# Patient Record
Sex: FEMALE | Race: OTHER | Employment: OTHER | URBAN - METROPOLITAN AREA
[De-identification: names, ages, dates, MRNs, and addresses within clinical notes are randomized per-mention and may not be internally consistent; named-entity substitution may affect disease eponyms.]

---

## 2017-07-14 ENCOUNTER — HOSPITAL ENCOUNTER (EMERGENCY)
Age: 22
Discharge: HOME | End: 2017-07-14
Attending: FAMILY MEDICINE
Payer: OTHER GOVERNMENT

## 2017-07-14 VITALS
BODY MASS INDEX: 26.58 KG/M2 | WEIGHT: 150 LBS | HEART RATE: 63 BPM | HEIGHT: 63 IN | TEMPERATURE: 98.1 F | SYSTOLIC BLOOD PRESSURE: 107 MMHG | RESPIRATION RATE: 18 BRPM | DIASTOLIC BLOOD PRESSURE: 64 MMHG | OXYGEN SATURATION: 99 %

## 2017-07-14 DIAGNOSIS — L73.2 HYDRADENITIS: Primary | ICD-10-CM

## 2017-07-14 PROCEDURE — 99283 EMERGENCY DEPT VISIT LOW MDM: CPT

## 2017-07-14 RX ORDER — TRAMADOL HYDROCHLORIDE 50 MG/1
50 TABLET ORAL
Qty: 10 TAB | Refills: 0 | Status: SHIPPED | OUTPATIENT
Start: 2017-07-14

## 2017-07-14 RX ORDER — CEPHALEXIN 500 MG/1
500 CAPSULE ORAL 4 TIMES DAILY
Qty: 28 CAP | Refills: 0 | Status: SHIPPED | OUTPATIENT
Start: 2017-07-14 | End: 2017-07-21

## 2017-07-14 RX ORDER — IBUPROFEN 800 MG/1
800 TABLET ORAL 3 TIMES DAILY
Qty: 20 TAB | Refills: 0 | Status: SHIPPED | OUTPATIENT
Start: 2017-07-14 | End: 2017-07-21

## 2017-07-15 NOTE — ED TRIAGE NOTES
Cyst under both arm pits, has had them in the past, took antibiotics and they return, started about `1.5 weeks ago

## 2017-07-15 NOTE — ED PROVIDER NOTES
[address]  EMERGENCY DEPARTMENT HISTORY AND PHYSICAL EXAM       Date: 7/14/2017   Patient Name: Kavon Jaimes   YOB: 1995  Medical Record Number: [id number]      HISTORY OF PRESENTING ILLNESS     Chief Complaint   Patient presents with    Cyst        History Provided By:  patient    Additional History:   9:18 PM    Devora Jaimes is a 23 y.o. female presenting ambulatory to the ED c/o 6/10 bilateral axilla pain from \"cysts\" x over 1 week. Pt states that a few of the cysts have been draining. Pt notes increase of her pain with arm movement. Pt states that she has had a hx of similar sxs with previous abscesses. Pt denies trying any medications for her sxs. Pt denies any history of chronic medical issues or surgeries. Pt specifically denies any fever/chills. PCP: None  Social Hx: - tobacco use, - alcohol use, - illicit drug use    There are no other complaints, changes, or physical findings at this time. PAST HISTORY    Past Medical History:   History reviewed. No pertinent past medical history. Past Surgical History:   History reviewed. No pertinent surgical history. Social History:   Social History   Substance Use Topics    Smoking status: Never Smoker    Smokeless tobacco: Never Used    Alcohol use No        Allergies:   No Known Allergies     Review of Systems   Review of Systems   Constitutional: Negative for chills and fever. Musculoskeletal: Positive for myalgias (bilateral axilla). Skin:        + \"cysts\" to bilateral axilla   All other systems reviewed and are negative. PHYSICAL EXAM    Vitals:    07/14/17 2059   BP: 107/64   Pulse: 63   Resp: 18   Temp: 98.1 °F (36.7 °C)   SpO2: 99%   Weight: 68 kg (150 lb)   Height: 5' 3\" (1.6 m)       Physical Exam   Nursing note and vitals reviewed. Vital signs and nursing notes reviewed    CONSTITUTIONAL: Alert, in no apparent distress; well-developed; well-nourished.   HEAD:  Normocephalic, atraumatic  EYES: PERRL; EOM's intact. ENTM: Nose: no rhinorrhea; Throat: no erythema or exudate, mucous membranes moist  Neck:  No JVD, supple without lymphadenopathy  RESP: Chest clear, equal breath sounds. CV: S1 and S2 WNL; No murmurs, gallops or rubs. GI: Normal bowel sounds, abdomen soft and non-tender. No masses or organomegaly. : No costo-vertebral angle tenderness. BACK:  Non-tender  UPPER EXT:  Normal inspection  LOWER EXT: No edema, no calf tenderness. Distal pulses intact. NEURO: CN intact, reflexes 2/4 and sym, strength 5/5 and sym, sensation intact. SKIN: No rashes; 3 small tender, non fluctuant cystic lesions to the right axilla. 2 small tender, non fluctuant cystic lesions to the right axilla. There is no warmth or erythema  PSYCH:  Alert and oriented, normal affect. DIAGNOSTIC RESULTS    Labs -    No results found for this or any previous visit (from the past 12 hour(s)). MEDICAL DECISION MAKING  I am the first provider for this patient. I reviewed the vital signs, available nursing notes, past medical history, past surgical history, family history and social history. Vital Signs-Reviewed the patient's vital signs. Patient Vitals for the past 12 hrs:   Temp Pulse Resp BP SpO2   07/14/17 2059 98.1 °F (36.7 °C) 63 18 107/64 99 %       Pulse Oximetry Analysis - Normal 99% on RA      Old Medical Records: Nursing notes. Procedures:   Procedures    ED Course:    9:18 PM  Initial assessment performed. Medications - No data to display      DISCHARGE NOTE:   9:27 PM  Patient has no new complaints, changes, or physical findings. Care plan outlined and precautions discussed. All medications were reviewed with the patient; will d/c home with Keflex, Motrin and Tramadol. All of pt's questions and concerns were addressed. Patient was instructed and agrees to follow up with PCP, as well as to return to the ED upon further deterioration. Patient is ready to go home. DIAGNOSIS  Clinical Impression:   1. Hydradenitis         Discussion:  Pt has a hx of multiple cysts in her axillas, which occasionally drain. None of the cysts are able to be drained today. Will tx with antibiotics, anti-inflammatories and pain meds. She is to use warm compresses and follow up with a PCP if her sxs do not improve or if they worsen. PLAN: D/C    Follow-up Information     Follow up With Details Comments Contact Info    [facility] [place] Schedule an appointment as soon as possible for a visit for PCP follow up, as needed [phone]    [facility] EMERGENCY DEPT  As needed, If symptoms worsen [address] [place] [id number]  [phone]          Discharge Medication List as of 7/14/2017  9:33 PM      START taking these medications    Details   cephALEXin (KEFLEX) 500 mg capsule Take 1 Cap by mouth four (4) times daily for 7 days. , Print, Disp-28 Cap, R-0      ibuprofen (MOTRIN) 800 mg tablet Take 1 Tab by mouth three (3) times daily for 7 days. , Print, Disp-20 Tab, R-0      traMADol (ULTRAM) 50 mg tablet Take 1 Tab by mouth every six (6) hours as needed for Pain. Max Daily Amount: 200 mg., Print, Disp-10 Tab, R-0         CONTINUE these medications which have NOT CHANGED    Details   norelgestromin-ethinyl estradiol (ORTHO EVRA) 150-35 mcg/24 hr 1 Patch by TransDERmal route Every Saturday., Historical Med             _______________________________   Fito Olson: This note is prepared by Dionne Ace. Shahab Smith, acting as Scribe for Rosetta Al MD; at 9:18 PM on 7/14/2017. Rosetta Al MD: The scribe's documentation has been prepared under my direction and personally reviewed by me in its entirety.  I confirm that the note above accurately reflects all work, treatment, procedures, and medical decision making performed by me.     _______________________________

## 2017-07-15 NOTE — DISCHARGE INSTRUCTIONS
Hidradenitis Suppurativa: Care Instructions  Your Care Instructions    Hidradenitis suppurativa (say \"rto-sbwp-ij-NY-tus sup-mick-uh-TY-vuh\") is a skin condition that causes lumps on the skin that look like pimples or boils. The lumps are usually painful and can break open and drain blood and bad-smelling pus. The condition can come and go for many years. Treatment for this condition may include antibiotics and other medicines. You may need surgery to remove the lumps. Home care includes wearing loose-fitting clothes and washing the area gently. You can help prevent lumps from coming back by staying at a healthy weight and not smoking. Doctors don't know exactly how this condition starts. But they do know that something irritates and inflames the hair follicles, causing them to swell and form lumps. This skin condition can't be spread from person to person (isn't contagious). Follow-up care is a key part of your treatment and safety. Be sure to make and go to all appointments, and call your doctor if you are having problems. It's also a good idea to know your test results and keep a list of the medicines you take. How can you care for yourself at home? Skin care  · Wash the area every day with mild soap. Use your hands rather than a washcloth or sponge when you wash that part of your body. · Leave the affected areas uncovered when you can. If you have lumps that are draining, you can cover them with a bandage or other dressing. Put petroleum jelly (such as Vaseline) on the dressing to help keep it from sticking. · Wear-loose fitting clothes that don't rub against the area. Avoid activities that cause skin to rub together. · If you have pain, try a warm compress. Soak a towel or washcloth in warm water, wring it out, and place it on the affected skin for about 10 minutes. Medicines  · Be safe with medicines. Take your medicines exactly as prescribed.  Call your doctor if you think you are having a problem with your medicine. You will get more details on the specific medicines your doctor prescribes. · If your doctor prescribed antibiotics, take them as directed. Do not stop taking them just because you feel better. You need to take the full course of antibiotics. Lifestyle choices  · If you smoke, think about quitting. Smoking can make the condition worse. If you need help quitting, talk to your doctor about stop-smoking programs and medicines. These can increase your chances of quitting for good. · Stay at a healthy weight, or lose weight, by eating healthy foods and being physically active. Being overweight could make this condition worse. When should you call for help? Call your doctor now or seek immediate medical care if:  · You have symptoms of infection, such as:  ¨ Increased pain, swelling, warmth, or redness. ¨ Red streaks leading from the area. ¨ Pus draining from the area. ¨ A fever. Watch closely for changes in your health, and be sure to contact your doctor if:  · You do not get better as expected. Where can you learn more? Go to http://kalpesh-adenike.info/. Enter A702 in the search box to learn more about \"Hidradenitis Suppurativa: Care Instructions. \"  Current as of: October 13, 2016  Content Version: 11.3  © 8114-6390 [employer]. Care instructions adapted under license by [employer] (which disclaims liability or warranty for this information). If you have questions about a medical condition or this instruction, always ask your healthcare professional. [place] [address] any warranty or liability for your use of this information.

## 2017-07-15 NOTE — ED NOTES
Amb into ed w/ reports 1 1/2 week history of multiple cysts under each armpit region - multiplying and growing in size and pain.